# Patient Record
Sex: MALE | Race: WHITE | NOT HISPANIC OR LATINO | Employment: OTHER | URBAN - METROPOLITAN AREA
[De-identification: names, ages, dates, MRNs, and addresses within clinical notes are randomized per-mention and may not be internally consistent; named-entity substitution may affect disease eponyms.]

---

## 2017-06-12 ENCOUNTER — OFFICE VISIT (OUTPATIENT)
Dept: CARDIOLOGY | Facility: CLINIC | Age: 67
End: 2017-06-12

## 2017-06-12 VITALS
HEIGHT: 70 IN | DIASTOLIC BLOOD PRESSURE: 82 MMHG | BODY MASS INDEX: 27.92 KG/M2 | SYSTOLIC BLOOD PRESSURE: 124 MMHG | HEART RATE: 67 BPM | WEIGHT: 195 LBS

## 2017-06-12 DIAGNOSIS — Z98.890 HISTORY OF MITRAL VALVE REPAIR: ICD-10-CM

## 2017-06-12 DIAGNOSIS — I10 ESSENTIAL HYPERTENSION: ICD-10-CM

## 2017-06-12 DIAGNOSIS — I34.0 NON-RHEUMATIC MITRAL REGURGITATION: Primary | ICD-10-CM

## 2017-06-12 PROCEDURE — 99213 OFFICE O/P EST LOW 20 MIN: CPT | Performed by: INTERNAL MEDICINE

## 2017-06-12 PROCEDURE — 93000 ELECTROCARDIOGRAM COMPLETE: CPT | Performed by: INTERNAL MEDICINE

## 2017-06-12 RX ORDER — LEVOTHYROXINE SODIUM 0.15 MG/1
TABLET ORAL DAILY
COMMUNITY
Start: 2017-05-20 | End: 2019-08-12 | Stop reason: ALTCHOICE

## 2017-06-12 NOTE — PROGRESS NOTES
Date of Office Visit: 2017  Encounter Provider: Alek Pastrana MD  Place of Service: [facility] CARDIOLOGY  Patient Name: Uche Ramos  :1950    Chief Complaint   Patient presents with   • Cardiac Valve Problem     Mitral Valve     :     HPI: Uche Ramos is a 67 y.o. male who presents today to follow-up. He has a history of mitral regurgitation/prolapse and underwent mitral valve repair in 2014. He had a very brief episode of paroxysmal atrial fibrillation postoperatively. 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 He has completely recovered and feels very well. He denies any palpitations, lightheadedness, edema, dyspnea, or chest pain. His coronaries were normal prior to surgery.       Past Medical History:   Diagnosis Date   • Anemia    • Basal cell carcinoma    • History of colonoscopy     DR. CEDENO   • Hypercholesterolemia    • Hypertension    • Hypothyroidism    • Lipoma    • Mitral valve insufficiency     3/2014: mod-severe, with moderate LAE and mild LVE, due to prolapse; s/p repair with #31 ATS simulus band and 4-0 Goretex neochordae to P2.  Echo 2014 completely normal (with evidence of prior repair)   • Parapneumonic effusion     pneumonia and post-pericardiotomy syndrome, 2014, requiring VATS/decortication   • Postoperative atrial fibrillation     after open heart surgery, no recurrence   • Recurrent left pleural effusion        Past Surgical History:   Procedure Laterality Date   • BRONCHOSCOPY     • COLONOSCOPY     • KNEE SURGERY     • MAZE PROCEDURE      Pulmonary venous ablation with cryoablation 14, Dr Sales   • MITRAL VALVE REPAIR/REPLACEMENT      Mitral Valve Repair 14, Dr Ram   • THORACOSCOPY W/ TALC PLEURODESIS         Social History     Social History   • Marital status:      Spouse name:  "N/A   • Number of children: N/A   • Years of education: N/A     Occupational History   • Not on file.     Social History Main Topics   • Smoking status: Former Smoker     Quit date: 1/1/1981   • Smokeless tobacco: Never Used   • Alcohol use Yes      Comment: 8/week/ social use   • Drug use: No   • Sexual activity: Defer     Other Topics Concern   • Not on file     Social History Narrative       Family History   Problem Relation Age of Onset   • Hypertension Mother    • Diabetes Mother    • Stroke Mother    • Hypertension Father    • Other Father      cardiac disorder  some type of heart disease in his 80's (? if valvular, conduction, CAD, or CHF)   • Stroke Other        Review of Systems   Musculoskeletal: Positive for joint pain.   All other systems reviewed and are negative.      Allergies   Allergen Reactions   • Codeine    • Other      ANESTHESIA IV SET MISC         Current Outpatient Prescriptions:   •  aspirin 81 MG EC tablet, Take  by mouth every other day., Disp: , Rfl:   •  atorvastatin (LIPITOR) 40 MG tablet, TAKE ONE TABLET BY MOUTH ONCE DAILY, Disp: 90 tablet, Rfl: 1  •  desonide (DESOWEN) 0.05 % lotion, Apply topically., Disp: , Rfl:   •  levothyroxine (SYNTHROID, LEVOTHROID) 100 MCG tablet, Daily., Disp: , Rfl:   •  lisinopril (PRINIVIL,ZESTRIL) 20 MG tablet, TAKE ONE TABLET BY MOUTH ONCE DAILY, Disp: 90 tablet, Rfl: 1  •  temazepam (RESTORIL) 7.5 MG capsule, Take 1 capsule by mouth at night as needed for sleep., Disp: 30 capsule, Rfl: 0     Objective:     Vitals:    06/12/17 1312   BP: 124/82   Pulse: 67   Weight: 195 lb (88.5 kg)   Height: 70\" (177.8 cm)     Body mass index is 27.98 kg/(m^2).    Physical Exam   Constitutional: He is oriented to person, place, and time. He appears well-developed and well-nourished.   HENT:   Head: Normocephalic.   Nose: Nose normal.   Mouth/Throat: Oropharynx is clear and moist.   Eyes: Conjunctivae and EOM are normal. Pupils are equal, round, and reactive to light. "   Neck: Normal range of motion. No JVD present.   Cardiovascular: Normal rate, regular rhythm, normal heart sounds and intact distal pulses.    No murmur heard.  Pulmonary/Chest: Effort normal and breath sounds normal.   Abdominal: Soft. He exhibits no mass. There is no tenderness.   Musculoskeletal: Normal range of motion. He exhibits no edema.   Lymphadenopathy:     He has no cervical adenopathy.   Neurological: He is alert and oriented to person, place, and time. No cranial nerve deficit.   Skin: Skin is warm and dry. No rash noted.   Psychiatric: He has a normal mood and affect. His behavior is normal. Judgment and thought content normal.   Vitals reviewed.        ECG 12 Lead  Date/Time: 6/12/2017 1:34 PM  Performed by: ALEK PASTRANA  Authorized by: ALEK PASTRANA   Comparison: compared with previous ECG   Similar to previous ECG  Rhythm: sinus rhythm  Conduction: conduction normal  ST Segments: ST segments normal  T Waves: T waves normal  QRS axis: normal  Other: no other findings  Clinical impression: normal ECG              Assessment:       Diagnosis Plan   1. Non-rheumatic mitral regurgitation  Adult Transthoracic Echo Complete   2. History of mitral valve repair  Adult Transthoracic Echo Complete   3. Essential hypertension            Plan:       1/2.  He has done very well.  His last echo was in August 2014 so it's time for a surveillance study.      3.  His BP is well controlled.      Sincerely,       Alek Pastrana MD

## 2017-06-19 ENCOUNTER — HOSPITAL ENCOUNTER (OUTPATIENT)
Dept: CARDIOLOGY | Facility: HOSPITAL | Age: 67
Discharge: HOME | End: 2017-06-19
Attending: INTERNAL MEDICINE | Admitting: INTERNAL MEDICINE

## 2017-06-19 VITALS
BODY MASS INDEX: 27.92 KG/M2 | SYSTOLIC BLOOD PRESSURE: 120 MMHG | HEIGHT: 70 IN | HEART RATE: 69 BPM | DIASTOLIC BLOOD PRESSURE: 70 MMHG | WEIGHT: 195 LBS

## 2017-06-19 DIAGNOSIS — I34.0 NON-RHEUMATIC MITRAL REGURGITATION: ICD-10-CM

## 2017-06-19 DIAGNOSIS — Z98.890 HISTORY OF MITRAL VALVE REPAIR: ICD-10-CM

## 2017-06-19 LAB
ASCENDING AORTA: 3.7 CM
BH CV ECHO MEAS - ACS: 2 CM
BH CV ECHO MEAS - AO MAX PG (FULL): 3.3 MMHG
BH CV ECHO MEAS - AO MAX PG: 6.8 MMHG
BH CV ECHO MEAS - AO MEAN PG (FULL): 1.9 MMHG
BH CV ECHO MEAS - AO MEAN PG: 3.6 MMHG
BH CV ECHO MEAS - AO ROOT AREA (BSA CORRECTED): 1.9
BH CV ECHO MEAS - AO ROOT AREA: 11.5 CM^2
BH CV ECHO MEAS - AO ROOT DIAM: 3.8 CM
BH CV ECHO MEAS - AO V2 MAX: 130.3 CM/SEC
BH CV ECHO MEAS - AO V2 MEAN: 88.6 CM/SEC
BH CV ECHO MEAS - AO V2 VTI: 25.9 CM
BH CV ECHO MEAS - AVA(I,A): 2.2 CM^2
BH CV ECHO MEAS - AVA(I,D): 2.2 CM^2
BH CV ECHO MEAS - AVA(V,A): 2.3 CM^2
BH CV ECHO MEAS - AVA(V,D): 2.3 CM^2
BH CV ECHO MEAS - BSA(HAYCOCK): 2.1 M^2
BH CV ECHO MEAS - BSA: 2.1 M^2
BH CV ECHO MEAS - BZI_BMI: 28 KILOGRAMS/M^2
BH CV ECHO MEAS - BZI_METRIC_HEIGHT: 177.8 CM
BH CV ECHO MEAS - BZI_METRIC_WEIGHT: 88.5 KG
BH CV ECHO MEAS - CONTRAST EF (2CH): 63.5 ML/M^2
BH CV ECHO MEAS - CONTRAST EF 4CH: 64.9 ML/M^2
BH CV ECHO MEAS - EDV(MOD-SP2): 115 ML
BH CV ECHO MEAS - EDV(MOD-SP4): 111 ML
BH CV ECHO MEAS - EDV(TEICH): 137.7 ML
BH CV ECHO MEAS - EF(CUBED): 62.1 %
BH CV ECHO MEAS - EF(MOD-SP2): 63.5 %
BH CV ECHO MEAS - EF(MOD-SP4): 64.9 %
BH CV ECHO MEAS - EF(TEICH): 53.2 %
BH CV ECHO MEAS - ESV(MOD-SP2): 42 ML
BH CV ECHO MEAS - ESV(MOD-SP4): 39 ML
BH CV ECHO MEAS - ESV(TEICH): 64.5 ML
BH CV ECHO MEAS - FS: 27.6 %
BH CV ECHO MEAS - IVS/LVPW: 1
BH CV ECHO MEAS - IVSD: 1.2 CM
BH CV ECHO MEAS - LAT PEAK E' VEL: 20 CM/SEC
BH CV ECHO MEAS - LV DIASTOLIC VOL/BSA (35-75): 53.8 ML/M^2
BH CV ECHO MEAS - LV MASS(C)D: 258.2 GRAMS
BH CV ECHO MEAS - LV MASS(C)DI: 125 GRAMS/M^2
BH CV ECHO MEAS - LV MAX PG: 3.5 MMHG
BH CV ECHO MEAS - LV MEAN PG: 1.7 MMHG
BH CV ECHO MEAS - LV SYSTOLIC VOL/BSA (12-30): 18.9 ML/M^2
BH CV ECHO MEAS - LV V1 MAX: 93.6 CM/SEC
BH CV ECHO MEAS - LV V1 MEAN: 60.7 CM/SEC
BH CV ECHO MEAS - LV V1 VTI: 17.5 CM
BH CV ECHO MEAS - LVIDD: 5.3 CM
BH CV ECHO MEAS - LVIDS: 3.9 CM
BH CV ECHO MEAS - LVLD AP2: 7.6 CM
BH CV ECHO MEAS - LVLD AP4: 7.7 CM
BH CV ECHO MEAS - LVLS AP2: 6.2 CM
BH CV ECHO MEAS - LVLS AP4: 6.6 CM
BH CV ECHO MEAS - LVOT AREA (M): 3.1 CM^2
BH CV ECHO MEAS - LVOT AREA: 3.2 CM^2
BH CV ECHO MEAS - LVOT DIAM: 2 CM
BH CV ECHO MEAS - LVPWD: 1.2 CM
BH CV ECHO MEAS - MED PEAK E' VEL: 21 CM/SEC
BH CV ECHO MEAS - MR MAX PG: 84.3 MMHG
BH CV ECHO MEAS - MR MAX VEL: 459 CM/SEC
BH CV ECHO MEAS - MV A DUR: 0.09 SEC
BH CV ECHO MEAS - MV A MAX VEL: 86.9 CM/SEC
BH CV ECHO MEAS - MV DEC SLOPE: 256.4 CM/SEC^2
BH CV ECHO MEAS - MV DEC TIME: 0.26 SEC
BH CV ECHO MEAS - MV E MAX VEL: 131.6 CM/SEC
BH CV ECHO MEAS - MV E/A: 1.5
BH CV ECHO MEAS - MV MAX PG: 5.8 MMHG
BH CV ECHO MEAS - MV MEAN PG: 2.1 MMHG
BH CV ECHO MEAS - MV P1/2T MAX VEL: 116.3 CM/SEC
BH CV ECHO MEAS - MV P1/2T: 132.9 MSEC
BH CV ECHO MEAS - MV V2 MAX: 120.2 CM/SEC
BH CV ECHO MEAS - MV V2 MEAN: 66.9 CM/SEC
BH CV ECHO MEAS - MV V2 VTI: 43.4 CM
BH CV ECHO MEAS - MVA P1/2T LCG: 1.9 CM^2
BH CV ECHO MEAS - MVA(P1/2T): 1.7 CM^2
BH CV ECHO MEAS - MVA(VTI): 1.3 CM^2
BH CV ECHO MEAS - PA ACC TIME: 0.09 SEC
BH CV ECHO MEAS - PA MAX PG (FULL): 1 MMHG
BH CV ECHO MEAS - PA MAX PG: 2.1 MMHG
BH CV ECHO MEAS - PA PR(ACCEL): 39.4 MMHG
BH CV ECHO MEAS - PA V2 MAX: 73.3 CM/SEC
BH CV ECHO MEAS - PULM A REVS DUR: 0.07 SEC
BH CV ECHO MEAS - PULM A REVS VEL: 20 CM/SEC
BH CV ECHO MEAS - PULM DIAS VEL: 40.9 CM/SEC
BH CV ECHO MEAS - PULM S/D: 0.78
BH CV ECHO MEAS - PULM SYS VEL: 31.9 CM/SEC
BH CV ECHO MEAS - PVA(V,A): 3.3 CM^2
BH CV ECHO MEAS - PVA(V,D): 3.3 CM^2
BH CV ECHO MEAS - QP/QS: 0.95
BH CV ECHO MEAS - RAP SYSTOLE: 3 MMHG
BH CV ECHO MEAS - RV MAX PG: 1.1 MMHG
BH CV ECHO MEAS - RV MEAN PG: 0.65 MMHG
BH CV ECHO MEAS - RV V1 MAX: 52.9 CM/SEC
BH CV ECHO MEAS - RV V1 MEAN: 38.8 CM/SEC
BH CV ECHO MEAS - RV V1 VTI: 11.7 CM
BH CV ECHO MEAS - RVOT AREA: 4.6 CM^2
BH CV ECHO MEAS - RVOT DIAM: 2.4 CM
BH CV ECHO MEAS - RVSP: 23.9 MMHG
BH CV ECHO MEAS - SI(AO): 144.5 ML/M^2
BH CV ECHO MEAS - SI(CUBED): 45.8 ML/M^2
BH CV ECHO MEAS - SI(LVOT): 27.5 ML/M^2
BH CV ECHO MEAS - SI(MOD-SP2): 35.4 ML/M^2
BH CV ECHO MEAS - SI(MOD-SP4): 34.9 ML/M^2
BH CV ECHO MEAS - SI(TEICH): 35.4 ML/M^2
BH CV ECHO MEAS - SUP REN AO DIAM: 1.8 CM
BH CV ECHO MEAS - SV(AO): 298.3 ML
BH CV ECHO MEAS - SV(CUBED): 94.5 ML
BH CV ECHO MEAS - SV(LVOT): 56.8 ML
BH CV ECHO MEAS - SV(MOD-SP2): 73 ML
BH CV ECHO MEAS - SV(MOD-SP4): 72 ML
BH CV ECHO MEAS - SV(RVOT): 53.9 ML
BH CV ECHO MEAS - SV(TEICH): 73.2 ML
BH CV ECHO MEAS - TAPSE (>1.6): 1.7 CM2
BH CV ECHO MEAS - TR MAX VEL: 228.8 CM/SEC
BH CV XLRA - RV BASE: 2.4 CM
BH CV XLRA - TDI S': 11 CM/SEC
E/E' RATIO: 20.5
LEFT ATRIUM VOLUME INDEX: 32 ML/M2
SINUS: 3.3 CM
STJ: 3 CM

## 2017-06-19 PROCEDURE — 93306 TTE W/DOPPLER COMPLETE: CPT | Performed by: INTERNAL MEDICINE

## 2017-06-19 PROCEDURE — 93306 TTE W/DOPPLER COMPLETE: CPT

## 2018-08-07 NOTE — PROGRESS NOTES
Date of Office Visit: 2018  Encounter Provider: Alek Pastrana MD  Place of Service: [facility] CARDIOLOGY  Patient Name: Uche Ramos  :1950    Chief Complaint   Patient presents with   • Non-rheumatic mitral regurgitation     Yearly    • Mitral valve insufficiency   :     HPI: Uche Ramos is a 68 y.o. male who presents today to follow-up. He has a history of mitral regurgitation/prolapse and underwent mitral valve repair in 2014. He had a very brief episode of paroxysmal atrial fibrillation postoperatively. 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 He has completely recovered and feels very well. He denies any palpitations, lightheadedness, edema, dyspnea, or chest pain. His coronaries were normal prior to surgery.     A surveillance echo in  showed mild-moderate MR.    Past Medical History:   Diagnosis Date   • Anemia    • Basal cell carcinoma    • History of colonoscopy     DR. CEDENO   • Hypercholesterolemia    • Hypertension    • Hypothyroidism    • Lipoma    • Mitral valve insufficiency     3/2014: mod-severe, with moderate LAE and mild LVE, due to prolapse; s/p repair with #31 ATS simulus band and 4-0 Goretex neochordae to P2.  Echo 2014 completely normal (with evidence of prior repair)   • Parapneumonic effusion     pneumonia and post-pericardiotomy syndrome, 2014, requiring VATS/decortication   • Postoperative atrial fibrillation (CMS/HCC)     after open heart surgery, no recurrence   • Recurrent left pleural effusion        Past Surgical History:   Procedure Laterality Date   • BRONCHOSCOPY     • COLONOSCOPY     • KNEE SURGERY     • MAZE PROCEDURE      Pulmonary venous ablation with cryoablation 14, Dr Sales   • MITRAL VALVE REPAIR/REPLACEMENT      Mitral Valve Repair 14, Dr Ram   • THORACOSCOPY W/  "TALC PLEURODESIS         Social History     Social History   • Marital status:      Spouse name: N/A   • Number of children: N/A   • Years of education: N/A     Occupational History   • Not on file.     Social History Main Topics   • Smoking status: Former Smoker     Quit date: 1/1/1981   • Smokeless tobacco: Never Used      Comment: Caffeine use   • Alcohol use Yes      Comment: 8/week/ social use   • Drug use: No   • Sexual activity: Defer     Other Topics Concern   • Not on file     Social History Narrative   • No narrative on file       Family History   Problem Relation Age of Onset   • Hypertension Mother    • Diabetes Mother    • Stroke Mother    • Hypertension Father    • Other Father         cardiac disorder  some type of heart disease in his 80's (? if valvular, conduction, CAD, or CHF)   • Stroke Other        Review of Systems   Musculoskeletal: Positive for joint pain.   All other systems reviewed and are negative.      Allergies   Allergen Reactions   • Codeine    • Other      ANESTHESIA IV SET MISC         Current Outpatient Prescriptions:   •  aspirin 81 MG EC tablet, Take  by mouth every other day., Disp: , Rfl:   •  atorvastatin (LIPITOR) 40 MG tablet, TAKE ONE TABLET BY MOUTH ONCE DAILY, Disp: 90 tablet, Rfl: 1  •  levothyroxine (SYNTHROID, LEVOTHROID) 150 MCG tablet, Daily., Disp: , Rfl:   •  lisinopril (PRINIVIL,ZESTRIL) 20 MG tablet, TAKE ONE TABLET BY MOUTH ONCE DAILY, Disp: 90 tablet, Rfl: 1  •  temazepam (RESTORIL) 7.5 MG capsule, Take 1 capsule by mouth at night as needed for sleep., Disp: 30 capsule, Rfl: 0  •  desonide (DESOWEN) 0.05 % lotion, Apply topically., Disp: , Rfl:      Objective:     Vitals:    08/09/18 1150   BP: 124/60   BP Location: Left arm   Pulse: 73   Weight: 83.5 kg (184 lb)   Height: 177.8 cm (70\")     Body mass index is 26.4 kg/m².    Physical Exam   Constitutional: He is oriented to person, place, and time. He appears well-developed and well-nourished.   HENT: "   Head: Normocephalic.   Nose: Nose normal.   Mouth/Throat: Oropharynx is clear and moist.   Eyes: Pupils are equal, round, and reactive to light. Conjunctivae and EOM are normal.   Neck: Normal range of motion. No JVD present.   Cardiovascular: Normal rate, regular rhythm, normal heart sounds and intact distal pulses.    No murmur heard.  Pulmonary/Chest: Effort normal and breath sounds normal.   Abdominal: Soft. He exhibits no mass. There is no tenderness.   Musculoskeletal: Normal range of motion. He exhibits no edema.   Lymphadenopathy:     He has no cervical adenopathy.   Neurological: He is alert and oriented to person, place, and time. No cranial nerve deficit.   Skin: Skin is warm and dry. No rash noted.   Psychiatric: He has a normal mood and affect. His behavior is normal. Judgment and thought content normal.   Vitals reviewed.        ECG 12 Lead  Date/Time: 8/9/2018 11:57 AM  Performed by: ALEK PASTRANA  Authorized by: ALEK PASTRANA   Comparison: compared with previous ECG   Similar to previous ECG  Rhythm: sinus rhythm  Conduction: conduction normal  ST Segments: ST segments normal  T Waves: T waves normal  QRS axis: normal  Other findings: PRWP  Clinical impression: non-specific ECG              Assessment:       Diagnosis Plan   1. Non-rheumatic mitral regurgitation     2. History of mitral valve repair     3. Essential hypertension            Plan:       1/2.  He has done very well.  His last echo was in 2017; this will be repeated in 2022.\    3.  His BP is well controlled.      Sincerely,       Alek Pastrana MD

## 2018-08-09 ENCOUNTER — OFFICE VISIT (OUTPATIENT)
Dept: CARDIOLOGY | Facility: CLINIC | Age: 68
End: 2018-08-09

## 2018-08-09 VITALS
BODY MASS INDEX: 26.34 KG/M2 | HEIGHT: 70 IN | SYSTOLIC BLOOD PRESSURE: 124 MMHG | DIASTOLIC BLOOD PRESSURE: 60 MMHG | HEART RATE: 73 BPM | WEIGHT: 184 LBS

## 2018-08-09 DIAGNOSIS — I10 ESSENTIAL HYPERTENSION: ICD-10-CM

## 2018-08-09 DIAGNOSIS — I34.0 NON-RHEUMATIC MITRAL REGURGITATION: Primary | ICD-10-CM

## 2018-08-09 DIAGNOSIS — Z98.890 HISTORY OF MITRAL VALVE REPAIR: ICD-10-CM

## 2018-08-09 PROCEDURE — 93000 ELECTROCARDIOGRAM COMPLETE: CPT | Performed by: INTERNAL MEDICINE

## 2018-08-09 PROCEDURE — 99213 OFFICE O/P EST LOW 20 MIN: CPT | Performed by: INTERNAL MEDICINE

## 2019-08-12 ENCOUNTER — OFFICE VISIT (OUTPATIENT)
Dept: CARDIOLOGY | Facility: CLINIC | Age: 69
End: 2019-08-12

## 2019-08-12 VITALS
HEART RATE: 77 BPM | DIASTOLIC BLOOD PRESSURE: 74 MMHG | SYSTOLIC BLOOD PRESSURE: 118 MMHG | BODY MASS INDEX: 26.17 KG/M2 | HEIGHT: 70 IN | WEIGHT: 182.8 LBS

## 2019-08-12 DIAGNOSIS — Z98.890 HISTORY OF MITRAL VALVE REPAIR: Primary | ICD-10-CM

## 2019-08-12 DIAGNOSIS — I10 ESSENTIAL HYPERTENSION: ICD-10-CM

## 2019-08-12 PROCEDURE — 93000 ELECTROCARDIOGRAM COMPLETE: CPT | Performed by: INTERNAL MEDICINE

## 2019-08-12 PROCEDURE — 99213 OFFICE O/P EST LOW 20 MIN: CPT | Performed by: INTERNAL MEDICINE

## 2019-08-12 RX ORDER — ZOLPIDEM TARTRATE 5 MG/1
TABLET ORAL AS NEEDED
COMMUNITY
Start: 2019-06-11 | End: 2021-10-28 | Stop reason: ALTCHOICE

## 2019-08-12 RX ORDER — LEVOTHYROXINE SODIUM 0.1 MG/1
TABLET ORAL DAILY
COMMUNITY
Start: 2019-08-09

## 2019-08-12 NOTE — PROGRESS NOTES
Date of Office Visit: 2018  Encounter Provider: Alek Pastrana MD  Place of Service: [facility] CARDIOLOGY  Patient Name: Uche Ramos  :1950    Chief Complaint   Patient presents with   • Cardiac Valve Problem   :     HPI: Uche Ramos is a 69 y.o. male who presents today to follow-up. He has a history of mitral regurgitation/prolapse and underwent mitral valve repair in 2014. He had a very brief episode of paroxysmal atrial fibrillation postoperatively. 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 He has completely recovered and feels very well. He denies any palpitations, lightheadedness, edema, dyspnea, or chest pain. His coronaries were normal prior to surgery.     A surveillance echo in  showed mild-moderate MR.    Past Medical History:   Diagnosis Date   • Anemia    • Basal cell carcinoma    • History of colonoscopy     DR. CEDENO   • Hypercholesterolemia    • Hypertension    • Hypothyroidism    • Lipoma    • Mitral valve insufficiency     3/2014: mod-severe, with moderate LAE and mild LVE, due to prolapse; s/p repair with #31 ATS simulus band and 4-0 Goretex neochordae to P2.  Echo 2014 completely normal (with evidence of prior repair)   • Parapneumonic effusion     pneumonia and post-pericardiotomy syndrome, 2014, requiring VATS/decortication   • Postoperative atrial fibrillation (CMS/HCC)     after open heart surgery, no recurrence   • Recurrent left pleural effusion        Past Surgical History:   Procedure Laterality Date   • BRONCHOSCOPY     • COLONOSCOPY     • KNEE SURGERY     • MAZE PROCEDURE      Pulmonary venous ablation with cryoablation 14, Dr Sales   • MITRAL VALVE REPAIR/REPLACEMENT      Mitral Valve Repair 14, Dr Ram   • THORACOSCOPY W/ TALC PLEURODESIS         Social History     Socioeconomic  "History   • Marital status:      Spouse name: Not on file   • Number of children: Not on file   • Years of education: Not on file   • Highest education level: Not on file   Occupational History   • Occupation: RETIRED    Tobacco Use   • Smoking status: Former Smoker     Last attempt to quit: 1981     Years since quittin.6   • Smokeless tobacco: Never Used   • Tobacco comment: Caffeine use: 1 CUP DAILY.   Substance and Sexual Activity   • Alcohol use: Yes     Alcohol/week: 1.2 oz     Types: 2 Cans of beer per week     Frequency: 2-3 times a week     Comment: 8/week/ social use   • Drug use: No   • Sexual activity: Defer       Family History   Problem Relation Age of Onset   • Hypertension Mother    • Diabetes Mother    • Stroke Mother    • Hypertension Father    • Other Father         cardiac disorder  some type of heart disease in his 80's (? if valvular, conduction, CAD, or CHF)   • Stroke Other        Review of Systems   All other systems reviewed and are negative.      Allergies   Allergen Reactions   • Codeine    • Other      ANESTHESIA IV SET MISC         Current Outpatient Medications:   •  aspirin 81 MG EC tablet, Take  by mouth every other day., Disp: , Rfl:   •  atorvastatin (LIPITOR) 40 MG tablet, TAKE ONE TABLET BY MOUTH ONCE DAILY, Disp: 90 tablet, Rfl: 1  •  desonide (DESOWEN) 0.05 % lotion, Apply topically., Disp: , Rfl:   •  levothyroxine (SYNTHROID, LEVOTHROID) 100 MCG tablet, Daily., Disp: , Rfl:   •  lisinopril (PRINIVIL,ZESTRIL) 20 MG tablet, TAKE ONE TABLET BY MOUTH ONCE DAILY, Disp: 90 tablet, Rfl: 1  •  zolpidem (AMBIEN) 5 MG tablet, As Needed., Disp: , Rfl:      Objective:     Vitals:    19 1147   BP: 118/74   BP Location: Left arm   Pulse: 77   Weight: 82.9 kg (182 lb 12.8 oz)   Height: 177.8 cm (70\")     Body mass index is 26.23 kg/m².    Physical Exam   Constitutional: He is oriented to person, place, and time. He appears well-developed and well-nourished.   HENT: "   Head: Normocephalic.   Nose: Nose normal.   Mouth/Throat: Oropharynx is clear and moist.   Eyes: Conjunctivae and EOM are normal. Pupils are equal, round, and reactive to light.   Neck: Normal range of motion. No JVD present.   Cardiovascular: Normal rate, regular rhythm, normal heart sounds and intact distal pulses.   No murmur heard.  Pulmonary/Chest: Effort normal and breath sounds normal.   Abdominal: Soft. There is no tenderness.   Musculoskeletal: Normal range of motion. He exhibits no edema.   Lymphadenopathy:     He has no cervical adenopathy.   Neurological: He is alert and oriented to person, place, and time. No cranial nerve deficit.   Skin: Skin is warm and dry. No rash noted.   Psychiatric: He has a normal mood and affect. His behavior is normal. Judgment and thought content normal.   Vitals reviewed.        ECG 12 Lead  Date/Time: 8/12/2019 12:05 PM  Performed by: Alek Pastrana MD  Authorized by: Alek Pastrana MD   Rhythm comments: Ectopic atrial rhythm  Conduction comments: Short MD interval  ST Segments: ST segments normal  T Waves: T waves normal  Other: no other findings    Clinical impression: non-specific ECG              Assessment:       Diagnosis Plan   1. History of mitral valve repair     2. Essential hypertension            Plan:       1.  He has done very well.  His last echo was in 2017; this will be repeated in 2022.    2.  His BP is well controlled.      Sincerely,       Alek Pastrana MD

## 2020-02-11 ENCOUNTER — TELEPHONE (OUTPATIENT)
Dept: CARDIOLOGY | Facility: CLINIC | Age: 70
End: 2020-02-11

## 2020-02-11 NOTE — TELEPHONE ENCOUNTER
Received a fax regarding pt's upcoming left knee surgery with epidural at [facility].      I faxed form back to the provided number.

## 2020-08-07 DIAGNOSIS — Z98.890 HISTORY OF MITRAL VALVE REPAIR: Primary | ICD-10-CM

## 2020-09-14 NOTE — PROGRESS NOTES
RM:________     PCP: Joo Barker MD    : 1950  AGE: 70 y.o.  EST PATIENT   REASON FOR VISIT/  CC:        WT: ____________ BP: __________L __________R HR______    CHEST PAIN: _____________    SOA: _____________PALPS: _______________     LIGHTHEADED: ___________FATIGUE: ________________ EDEMA __________    ALLERGIES:Codeine and Other SMOKING HISTORY:  Social History     Tobacco Use   • Smoking status: Former Smoker     Quit date: 1981     Years since quittin.7   • Smokeless tobacco: Never Used   • Tobacco comment: Caffeine use: 1 CUP DAILY.   Substance Use Topics   • Alcohol use: Yes     Alcohol/week: 2.0 standard drinks     Types: 2 Cans of beer per week     Frequency: 2-3 times a week     Comment: 8/week/ social use   • Drug use: No     CAFFEINE USE_________________  ALCOHOL ______________________    Below is the patient's most recent value for Albumin, ALT, AST, BUN, Calcium, Chloride, Cholesterol, CO2, Creatinine, GFR, Glucose, HDL, Hematocrit, Hemoglobin, Hemoglobin A1C, LDL, Magnesium, Phosphorus, Platelets, Potassium, PSA, Sodium, Triglycerides, TSH and WBC.   Lab Results   Component Value Date    ALBUMIN 3.8 2019    ALT 24 2019    AST 26 2019    BUN 19 2019    CALCIUM 9.2 2019     2019    CO2 23 2019    CREATININE 0.9 2019    GLU 77 2019    HDL 42 2019    HCT 43.9 2019    HGB 13.8 2019    HGBA1C 5.1 2018    LDL 70 2019    MG 2.4 2015     2019    K 4.7 2019    PSA 0.50 2018     2019    TRIG 119 2019    TSH 0.054 (L) 2019    WBC 6.67 2019          NEW DIAGNOSIS/ SURGERY/ HOSP OR ED VISITS: ______________________    __________________________________________________________________      RECENT LABS OR DIAGNOSTIC TESTING:  _____________________________    __________________________________________________________________      ASSESSMENT/  PLAN: _______________________________________________    __________________________________________________________________

## 2020-09-17 ENCOUNTER — OFFICE VISIT (OUTPATIENT)
Dept: CARDIOLOGY | Facility: CLINIC | Age: 70
End: 2020-09-17

## 2020-09-17 ENCOUNTER — HOSPITAL ENCOUNTER (OUTPATIENT)
Dept: CARDIOLOGY | Facility: HOSPITAL | Age: 70
Discharge: HOME | End: 2020-09-17
Admitting: INTERNAL MEDICINE

## 2020-09-17 VITALS — HEIGHT: 70 IN | HEART RATE: 68 BPM | BODY MASS INDEX: 26.05 KG/M2 | WEIGHT: 182 LBS

## 2020-09-17 VITALS
BODY MASS INDEX: 28.26 KG/M2 | HEART RATE: 60 BPM | DIASTOLIC BLOOD PRESSURE: 84 MMHG | SYSTOLIC BLOOD PRESSURE: 120 MMHG | HEIGHT: 70 IN | WEIGHT: 197.4 LBS

## 2020-09-17 DIAGNOSIS — I10 ESSENTIAL HYPERTENSION: ICD-10-CM

## 2020-09-17 DIAGNOSIS — Z98.890 HISTORY OF MITRAL VALVE REPAIR: ICD-10-CM

## 2020-09-17 DIAGNOSIS — Z98.890 HISTORY OF MITRAL VALVE REPAIR: Primary | ICD-10-CM

## 2020-09-17 LAB
AORTIC ARCH: 2.4 CM
ASCENDING AORTA: 3.7 CM
BH CV ECHO MEAS - ACS: 2.2 CM
BH CV ECHO MEAS - AO MAX PG (FULL): 1.6 MMHG
BH CV ECHO MEAS - AO MAX PG: 5.6 MMHG
BH CV ECHO MEAS - AO MEAN PG (FULL): 0.68 MMHG
BH CV ECHO MEAS - AO MEAN PG: 2.3 MMHG
BH CV ECHO MEAS - AO V2 MAX: 118.3 CM/SEC
BH CV ECHO MEAS - AO V2 MEAN: 68.7 CM/SEC
BH CV ECHO MEAS - AO V2 VTI: 24.2 CM
BH CV ECHO MEAS - ASC AORTA: 3.7 CM
BH CV ECHO MEAS - AVA(I,A): 3.4 CM^2
BH CV ECHO MEAS - AVA(I,D): 3.4 CM^2
BH CV ECHO MEAS - AVA(V,A): 3.3 CM^2
BH CV ECHO MEAS - AVA(V,D): 3.3 CM^2
BH CV ECHO MEAS - BSA(HAYCOCK): 2 M^2
BH CV ECHO MEAS - BSA: 2 M^2
BH CV ECHO MEAS - BZI_BMI: 26.1 KILOGRAMS/M^2
BH CV ECHO MEAS - BZI_METRIC_HEIGHT: 177.8 CM
BH CV ECHO MEAS - BZI_METRIC_WEIGHT: 82.6 KG
BH CV ECHO MEAS - EDV(MOD-SP2): 80 ML
BH CV ECHO MEAS - EDV(MOD-SP4): 82 ML
BH CV ECHO MEAS - EDV(TEICH): 151.2 ML
BH CV ECHO MEAS - EF(CUBED): 62.4 %
BH CV ECHO MEAS - EF(MOD-BP): 64.3 %
BH CV ECHO MEAS - EF(MOD-SP2): 66.3 %
BH CV ECHO MEAS - EF(MOD-SP4): 63.4 %
BH CV ECHO MEAS - EF(TEICH): 53.4 %
BH CV ECHO MEAS - ESV(MOD-SP2): 27 ML
BH CV ECHO MEAS - ESV(MOD-SP4): 30 ML
BH CV ECHO MEAS - ESV(TEICH): 70.5 ML
BH CV ECHO MEAS - FS: 27.8 %
BH CV ECHO MEAS - IVS/LVPW: 0.95
BH CV ECHO MEAS - IVSD: 1.1 CM
BH CV ECHO MEAS - LAT PEAK E' VEL: 7.4 CM/SEC
BH CV ECHO MEAS - LV DIASTOLIC VOL/BSA (35-75): 40.9 ML/M^2
BH CV ECHO MEAS - LV MASS(C)D: 243.7 GRAMS
BH CV ECHO MEAS - LV MASS(C)DI: 121.5 GRAMS/M^2
BH CV ECHO MEAS - LV MAX PG: 4 MMHG
BH CV ECHO MEAS - LV MEAN PG: 1.6 MMHG
BH CV ECHO MEAS - LV SYSTOLIC VOL/BSA (12-30): 15 ML/M^2
BH CV ECHO MEAS - LV V1 MAX: 99.8 CM/SEC
BH CV ECHO MEAS - LV V1 MEAN: 55.2 CM/SEC
BH CV ECHO MEAS - LV V1 VTI: 20.8 CM
BH CV ECHO MEAS - LVIDD: 5.6 CM
BH CV ECHO MEAS - LVIDS: 4 CM
BH CV ECHO MEAS - LVLD AP2: 8.5 CM
BH CV ECHO MEAS - LVLD AP4: 8.2 CM
BH CV ECHO MEAS - LVLS AP2: 6.8 CM
BH CV ECHO MEAS - LVLS AP4: 7.3 CM
BH CV ECHO MEAS - LVOT AREA (M): 3.8 CM^2
BH CV ECHO MEAS - LVOT AREA: 3.9 CM^2
BH CV ECHO MEAS - LVOT DIAM: 2.2 CM
BH CV ECHO MEAS - LVPWD: 1.1 CM
BH CV ECHO MEAS - MED PEAK E' VEL: 6.9 CM/SEC
BH CV ECHO MEAS - MR MAX PG: 110.5 MMHG
BH CV ECHO MEAS - MR MAX VEL: 525.6 CM/SEC
BH CV ECHO MEAS - MV A DUR: 0.14 SEC
BH CV ECHO MEAS - MV A MAX VEL: 83.2 CM/SEC
BH CV ECHO MEAS - MV DEC SLOPE: 451.1 CM/SEC^2
BH CV ECHO MEAS - MV DEC TIME: 0.35 SEC
BH CV ECHO MEAS - MV E MAX VEL: 115 CM/SEC
BH CV ECHO MEAS - MV E/A: 1.4
BH CV ECHO MEAS - MV MAX PG: 8.5 MMHG
BH CV ECHO MEAS - MV MEAN PG: 2.9 MMHG
BH CV ECHO MEAS - MV P1/2T MAX VEL: 126.7 CM/SEC
BH CV ECHO MEAS - MV P1/2T: 82.3 MSEC
BH CV ECHO MEAS - MV V2 MAX: 146 CM/SEC
BH CV ECHO MEAS - MV V2 MEAN: 79.4 CM/SEC
BH CV ECHO MEAS - MV V2 VTI: 42.8 CM
BH CV ECHO MEAS - MVA P1/2T LCG: 1.7 CM^2
BH CV ECHO MEAS - MVA(P1/2T): 2.7 CM^2
BH CV ECHO MEAS - MVA(VTI): 1.9 CM^2
BH CV ECHO MEAS - PA MAX PG (FULL): 1.8 MMHG
BH CV ECHO MEAS - PA MAX PG: 2.9 MMHG
BH CV ECHO MEAS - PA V2 MAX: 84.5 CM/SEC
BH CV ECHO MEAS - PULM A REVS DUR: 0.12 SEC
BH CV ECHO MEAS - PULM A REVS VEL: 22.9 CM/SEC
BH CV ECHO MEAS - PULM DIAS VEL: 47.7 CM/SEC
BH CV ECHO MEAS - PULM S/D: 0.65
BH CV ECHO MEAS - PULM SYS VEL: 30.8 CM/SEC
BH CV ECHO MEAS - PVA(V,A): 2.3 CM^2
BH CV ECHO MEAS - PVA(V,D): 2.3 CM^2
BH CV ECHO MEAS - QP/QS: 0.57
BH CV ECHO MEAS - RV MAX PG: 1.1 MMHG
BH CV ECHO MEAS - RV MEAN PG: 0.51 MMHG
BH CV ECHO MEAS - RV V1 MAX: 52.3 CM/SEC
BH CV ECHO MEAS - RV V1 MEAN: 31.7 CM/SEC
BH CV ECHO MEAS - RV V1 VTI: 12.6 CM
BH CV ECHO MEAS - RVOT AREA: 3.7 CM^2
BH CV ECHO MEAS - RVOT DIAM: 2.2 CM
BH CV ECHO MEAS - SI(CUBED): 53.5 ML/M^2
BH CV ECHO MEAS - SI(LVOT): 40.6 ML/M^2
BH CV ECHO MEAS - SI(MOD-SP2): 26.4 ML/M^2
BH CV ECHO MEAS - SI(MOD-SP4): 25.9 ML/M^2
BH CV ECHO MEAS - SI(TEICH): 40.2 ML/M^2
BH CV ECHO MEAS - SV(CUBED): 107.4 ML
BH CV ECHO MEAS - SV(LVOT): 81.3 ML
BH CV ECHO MEAS - SV(MOD-SP2): 53 ML
BH CV ECHO MEAS - SV(MOD-SP4): 52 ML
BH CV ECHO MEAS - SV(RVOT): 46.7 ML
BH CV ECHO MEAS - SV(TEICH): 80.7 ML
BH CV ECHO MEAS - TAPSE (>1.6): 2 CM2
BH CV ECHO MEASUREMENTS AVERAGE E/E' RATIO: 16.08
BH CV XLRA - RV BASE: 3.5 CM
BH CV XLRA - RV LENGTH: 6.8 CM
BH CV XLRA - RV MID: 2.7 CM
BH CV XLRA - TDI S': 9.7 CM/SEC
LEFT ATRIUM VOLUME INDEX: 24 ML/M2
SINUS: 4 CM
STJ: 3.2 CM

## 2020-09-17 PROCEDURE — 93306 TTE W/DOPPLER COMPLETE: CPT

## 2020-09-17 PROCEDURE — 99213 OFFICE O/P EST LOW 20 MIN: CPT | Performed by: INTERNAL MEDICINE

## 2020-09-17 PROCEDURE — 93306 TTE W/DOPPLER COMPLETE: CPT | Performed by: INTERNAL MEDICINE

## 2020-09-17 PROCEDURE — 93000 ELECTROCARDIOGRAM COMPLETE: CPT | Performed by: INTERNAL MEDICINE

## 2020-09-17 NOTE — PROGRESS NOTES
Date of Office Visit: 2020  Encounter Provider: Alek Pastrana MD  Place of Service: [facility] CARDIOLOGY  Patient Name: Uche Ramos  :1950    Chief Complaint   Patient presents with   • Mitral Valve Prolapse   :     HPI: Uche Ramos is a 70 y.o. male who presents today to follow-up.     He has a history of mitral regurgitation/prolapse and underwent mitral valve repair in 2014. He had a very brief episode of paroxysmal atrial fibrillation postoperatively. 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 He has completely recovered and feels very well. He denies any palpitations, lightheadedness, edema, dyspnea, or chest pain. His coronaries were normal prior to surgery.     A surveillance echo in  showed mild MR and was otherwise normal.    Past Medical History:   Diagnosis Date   • Anemia    • Basal cell carcinoma    • History of colonoscopy     DR. CEDENO   • Hypercholesterolemia    • Hypertension    • Hypothyroidism    • Lipoma    • Mitral valve insufficiency     3/2014: mod-severe, with moderate LAE and mild LVE, due to prolapse; s/p repair with #31 ATS simulus band and 4-0 Goretex neochordae to P2.  Echo 2014 completely normal (with evidence of prior repair)   • Parapneumonic effusion     pneumonia and post-pericardiotomy syndrome, 2014, requiring VATS/decortication   • Postoperative atrial fibrillation (CMS/HCC)     after open heart surgery, no recurrence   • Recurrent left pleural effusion        Past Surgical History:   Procedure Laterality Date   • BRONCHOSCOPY     • COLONOSCOPY     • KNEE SURGERY     • MAZE PROCEDURE      Pulmonary venous ablation with cryoablation 14, Dr Sales   • MITRAL VALVE REPAIR/REPLACEMENT      Mitral Valve Repair 14, Dr Ram   • THORACOSCOPY W/ TALC PLEURODESIS         Social History  "    Socioeconomic History   • Marital status:      Spouse name: Not on file   • Number of children: Not on file   • Years of education: Not on file   • Highest education level: Not on file   Occupational History   • Occupation: RETIRED    Tobacco Use   • Smoking status: Former Smoker     Quit date: 1981     Years since quittin.7   • Smokeless tobacco: Never Used   • Tobacco comment: Caffeine use: 1 CUP DAILY.   Substance and Sexual Activity   • Alcohol use: Yes     Alcohol/week: 2.0 standard drinks     Types: 2 Cans of beer per week     Frequency: 2-3 times a week     Comment: 8/week/ social use   • Drug use: No   • Sexual activity: Defer       Family History   Problem Relation Age of Onset   • Hypertension Mother    • Diabetes Mother    • Stroke Mother    • Hypertension Father    • Other Father         cardiac disorder  some type of heart disease in his 80's (? if valvular, conduction, CAD, or CHF)   • Stroke Other        Review of Systems   Musculoskeletal: Positive for arthritis.   All other systems reviewed and are negative.      Allergies   Allergen Reactions   • Codeine    • Other      ANESTHESIA IV SET MISC         Current Outpatient Medications:   •  aspirin 81 MG EC tablet, Take  by mouth every other day., Disp: , Rfl:   •  atorvastatin (LIPITOR) 40 MG tablet, TAKE ONE TABLET BY MOUTH ONCE DAILY, Disp: 90 tablet, Rfl: 1  •  desonide (DESOWEN) 0.05 % lotion, Apply topically., Disp: , Rfl:   •  levothyroxine (SYNTHROID, LEVOTHROID) 100 MCG tablet, Daily., Disp: , Rfl:   •  lisinopril (PRINIVIL,ZESTRIL) 20 MG tablet, TAKE ONE TABLET BY MOUTH ONCE DAILY, Disp: 90 tablet, Rfl: 1  •  zolpidem (AMBIEN) 5 MG tablet, As Needed., Disp: , Rfl:      Objective:     Vitals:    20 0849   BP: 120/84   Pulse: 60   Weight: 89.5 kg (197 lb 6.4 oz)   Height: 177.8 cm (70\")     Body mass index is 28.32 kg/m².    Physical Exam   Constitutional: He is oriented to person, place, and time. He appears " well-developed and well-nourished.   HENT:   Head: Normocephalic.   Nose: Nose normal.   Eyes: Pupils are equal, round, and reactive to light. Conjunctivae and EOM are normal.   Neck: Normal range of motion. No JVD present.   Cardiovascular: Normal rate, regular rhythm, normal heart sounds and intact distal pulses.   No murmur heard.  Pulmonary/Chest: Effort normal and breath sounds normal.   Abdominal: Soft. There is no abdominal tenderness.   Musculoskeletal: Normal range of motion.         General: No edema.   Neurological: He is alert and oriented to person, place, and time. No cranial nerve deficit.   Skin: Skin is warm and dry. No rash noted.   Psychiatric: He has a normal mood and affect. His behavior is normal. Judgment and thought content normal.   Vitals reviewed.        ECG 12 Lead    Date/Time: 9/17/2020 9:06 AM  Performed by: Alek Pastrana MD  Authorized by: Alek Pastrana MD   Comparison: compared with previous ECG   Similar to previous ECG  Rhythm: sinus rhythm  Conduction: conduction normal  ST Segments: ST segments normal  T Waves: T waves normal  QRS axis: normal  Other: no other findings    Clinical impression: normal ECG            Assessment:       Diagnosis Plan   1. History of mitral valve repair     2. Essential hypertension       Plan:       1.  He has done very well.  He had a surveillance echo today that looked great.     2.  His BP is very well controlled.      Sincerely,       Alek Pastrana MD

## 2020-12-14 RX ORDER — AMOXICILLIN 500 MG/1
2000 CAPSULE ORAL SEE ADMIN INSTRUCTIONS
Qty: 12 CAPSULE | Refills: 0 | Status: SHIPPED | OUTPATIENT
Start: 2020-12-14 | End: 2021-10-28 | Stop reason: SDUPTHER

## 2020-12-14 NOTE — TELEPHONE ENCOUNTER
Please see pending rx for pre med prior to dental procedure.  Pt has hx of MVP and has a dental procedure that they will be rescheduling.

## 2021-03-19 ENCOUNTER — BULK ORDERING (OUTPATIENT)
Dept: CASE MANAGEMENT | Facility: OTHER | Age: 71
End: 2021-03-19

## 2021-03-19 DIAGNOSIS — Z23 IMMUNIZATION DUE: ICD-10-CM

## 2021-04-02 ENCOUNTER — TELEPHONE (OUTPATIENT)
Dept: CARDIOLOGY | Facility: CLINIC | Age: 71
End: 2021-04-02

## 2021-04-02 NOTE — TELEPHONE ENCOUNTER
----- Message from Sadia Mathis MA sent at 4/2/2021  1:46 PM EDT -----  Regarding: FW: Non-Urgent Medical Question  Contact: [phone]    ----- Message -----  From: Uche Ramos  Sent: 4/2/2021   1:44 PM EDT  To: Domo Shoemaker [facility]  Subject: Non-Urgent Medical Question                      Dr. Pastrana,  If you could give me a call at [phone] I would greatly appreciate it. Thanks, Jose

## 2021-04-02 NOTE — TELEPHONE ENCOUNTER
I called -- he had a question about his oldest sister who has mitral valve disease and needs to be seen.     Lashawn Adams  He doesn't know her   Her home phone # is: [phone]  Her cell # is: [phone]    Please call patient and see who she has been seeing in [place].  I will need their OV note and the echo she had done at the hospital there on a disc before she arrives. We can add her to [facility] schedule on .

## 2021-04-02 NOTE — ADDENDUM NOTE
Addended by: WATSON MAYEN on: 4/2/2021 04:15 PM     Modules accepted: Level of Service, SmartSet

## 2021-06-04 ENCOUNTER — TELEPHONE (OUTPATIENT)
Dept: CARDIOLOGY | Facility: CLINIC | Age: 71
End: 2021-06-04

## 2021-06-04 NOTE — TELEPHONE ENCOUNTER
----- Message from Sadia Mathis MA sent at 6/4/2021  2:07 PM EDT -----  Regarding: FW: Non-Urgent Medical Question  Contact: [phone]    ----- Message -----  From: Uche Ramos  Sent: 6/4/2021   1:48 PM EDT  To: Domo Shoemaker [facility]  Subject: Non-Urgent Medical Question                      Dr. Pastrana  I would appreciate it if you could give me a call for a sec. not urgent.   [phone]    Hoang Garcia

## 2021-10-20 NOTE — PROGRESS NOTES
RM:________     PCP: Joo Barker MD    : 1950  AGE: 71 y.o.  EST PATIENT   REASON FOR VISIT/  CC:    BP Readings from Last 3 Encounters:   20 120/84   19 118/74   18 124/60        WT: ____________ BP: __________L __________R HR______    CHEST PAIN: _____________    SOA: _____________PALPS: _______________     LIGHTHEADED: ___________FATIGUE: ________________ EDEMA __________    ALLERGIES:Codeine and Other SMOKING HISTORY:  Social History     Tobacco Use   • Smoking status: Former Smoker     Quit date: 1981     Years since quittin.8   • Smokeless tobacco: Never Used   • Tobacco comment: Caffeine use: 1 CUP DAILY.   Substance Use Topics   • Alcohol use: Yes     Alcohol/week: 2.0 standard drinks     Types: 2 Cans of beer per week     Comment: 8/week/ social use   • Drug use: No     CAFFEINE USE_________________  ALCOHOL ______________________    Below is the patient's most recent value for Albumin, ALT, AST, BUN, Calcium, Chloride, Cholesterol, CO2, Creatinine, GFR, Glucose, HDL, Hematocrit, Hemoglobin, Hemoglobin A1C, LDL, Magnesium, Phosphorus, Platelets, Potassium, PSA, Sodium, Triglycerides, TSH and WBC.   Lab Results   Component Value Date    ALBUMIN 4.4 07/15/2020    ALT 26 07/15/2020    AST 27 07/15/2020    BUN 13 07/15/2020    CALCIUM 9.7 07/15/2020     07/15/2020    CO2 25 07/15/2020    CREATININE 0.7 07/15/2020    GLU 86 07/15/2020    HDL 48 07/15/2020    HCT 44.7 2019    HGB 14.1 2019    HGBA1C 5.2 2019    LDL 62 07/15/2020    MG 2.4 2015     2019    K 4.9 07/15/2020    PSA 0.55 2019     07/15/2020    TRIG 126 07/15/2020    TSH 0.830 07/15/2020    WBC 6.88 2019          NEW DIAGNOSIS/ SURGERY/ HOSP OR ED VISITS: ______________________    __________________________________________________________________      RECENT LABS OR DIAGNOSTIC TESTING:   _____________________________    __________________________________________________________________      ASSESSMENT/ PLAN: _______________________________________________    __________________________________________________________________

## 2021-10-28 ENCOUNTER — OFFICE VISIT (OUTPATIENT)
Dept: CARDIOLOGY | Facility: CLINIC | Age: 71
End: 2021-10-28

## 2021-10-28 VITALS
DIASTOLIC BLOOD PRESSURE: 70 MMHG | BODY MASS INDEX: 27.81 KG/M2 | HEIGHT: 69 IN | WEIGHT: 187.8 LBS | HEART RATE: 63 BPM | SYSTOLIC BLOOD PRESSURE: 118 MMHG

## 2021-10-28 DIAGNOSIS — Z98.890 HISTORY OF MITRAL VALVE REPAIR: Primary | ICD-10-CM

## 2021-10-28 DIAGNOSIS — I10 PRIMARY HYPERTENSION: ICD-10-CM

## 2021-10-28 PROCEDURE — 93000 ELECTROCARDIOGRAM COMPLETE: CPT | Performed by: INTERNAL MEDICINE

## 2021-10-28 PROCEDURE — 99213 OFFICE O/P EST LOW 20 MIN: CPT | Performed by: INTERNAL MEDICINE

## 2021-10-28 RX ORDER — AMOXICILLIN 500 MG/1
2000 CAPSULE ORAL SEE ADMIN INSTRUCTIONS
Qty: 4 CAPSULE | Refills: 3 | Status: SHIPPED | OUTPATIENT
Start: 2021-10-28 | End: 2022-10-31 | Stop reason: SDUPTHER

## 2021-10-28 RX ORDER — SILDENAFIL CITRATE 20 MG/1
20-60 TABLET ORAL
COMMUNITY
Start: 2021-07-22

## 2021-10-28 NOTE — PROGRESS NOTES
Date of Office Visit: 10/28/2021  Encounter Provider: Alek Pastrana MD  Place of Service: [facility] CARDIOLOGY  Patient Name: Uche Ramos  :1950    Chief Complaint   Patient presents with   • Cardiac Valve Problem   :     HPI: Uche Ramos is a 71 y.o. male who presents today to follow-up. I have reviewed prior notes and there are no changes except for any new updates described below. I have also reviewed any information entered into the medical record by the patient or by ancillary staff.     He has a history of mitral regurgitation/prolapse and underwent mitral valve repair in 2014. He had a very brief episode of paroxysmal atrial fibrillation postoperatively. 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 He has completely recovered and feels very well. He denies any palpitations, lightheadedness, edema, dyspnea, or chest pain. His coronaries were normal prior to surgery.     A surveillance echo in  showed mild MR and was otherwise normal.    Past Medical History:   Diagnosis Date   • Anemia    • Basal cell carcinoma    • History of colonoscopy     DR. CEDENO   • Hypercholesterolemia    • Hypertension    • Hypothyroidism    • Lipoma    • Mitral valve insufficiency     3/2014: mod-severe, with moderate LAE and mild LVE, due to prolapse; s/p repair with #31 ATS simulus band and 4-0 Goretex neochordae to P2.  Echo 2014 completely normal (with evidence of prior repair)   • Parapneumonic effusion     pneumonia and post-pericardiotomy syndrome, 2014, requiring VATS/decortication   • Postoperative atrial fibrillation (HCC)     after open heart surgery, no recurrence   • Recurrent left pleural effusion        Past Surgical History:   Procedure Laterality Date   • BRONCHOSCOPY     • COLONOSCOPY     • KNEE SURGERY     • MAZE PROCEDURE       Pulmonary venous ablation with cryoablation 14, Dr Sales   • MITRAL VALVE REPAIR/REPLACEMENT      Mitral Valve Repair 14, Dr Ram   • THORACOSCOPY W/ TALC PLEURODESIS         Social History     Socioeconomic History   • Marital status:    Tobacco Use   • Smoking status: Former Smoker     Quit date: 1981     Years since quittin.8   • Smokeless tobacco: Never Used   • Tobacco comment: Caffeine use: 1 CUP DAILY.   Vaping Use   • Vaping Use: Never used   Substance and Sexual Activity   • Alcohol use: Yes     Alcohol/week: 2.0 standard drinks     Types: 2 Cans of beer per week     Comment: 8/week/ social use   • Drug use: No   • Sexual activity: Defer       Family History   Problem Relation Age of Onset   • Hypertension Mother    • Diabetes Mother    • Stroke Mother    • Hypertension Father    • Other Father         cardiac disorder  some type of heart disease in his 80's (? if valvular, conduction, CAD, or CHF)   • Stroke Other        Review of Systems   Musculoskeletal: Positive for arthritis.   All other systems reviewed and are negative.      Allergies   Allergen Reactions   • Codeine    • Other      ANESTHESIA IV SET MISC         Current Outpatient Medications:   •  aspirin 81 MG EC tablet, Take  by mouth every other day., Disp: , Rfl:   •  atorvastatin (LIPITOR) 40 MG tablet, TAKE ONE TABLET BY MOUTH ONCE DAILY, Disp: 90 tablet, Rfl: 1  •  desonide (DESOWEN) 0.05 % lotion, Apply topically., Disp: , Rfl:   •  levothyroxine (SYNTHROID, LEVOTHROID) 100 MCG tablet, Daily., Disp: , Rfl:   •  lisinopril (PRINIVIL,ZESTRIL) 20 MG tablet, TAKE ONE TABLET BY MOUTH ONCE DAILY, Disp: 90 tablet, Rfl: 1  •  sildenafil (REVATIO) 20 MG tablet, Take 20-60 mg by mouth., Disp: , Rfl:   •  amoxicillin (AMOXIL) 500 MG capsule, Take 4 capsules by mouth See Admin Instructions. 4 capsules 1 hour prior to procedure, Disp: 12 capsule, Rfl: 0     Objective:     Vitals:    10/28/21 1032   BP: 118/70   BP Location:  "Left arm   Pulse: 63   Weight: 85.2 kg (187 lb 12.8 oz)   Height: 175.3 cm (69\")     Body mass index is 27.73 kg/m².    Physical Exam  Vitals reviewed.   Constitutional:       Appearance: He is well-developed.   HENT:      Head: Normocephalic.      Nose: Nose normal.      Mouth/Throat:      Comments: masked  Eyes:      Conjunctiva/sclera: Conjunctivae normal.   Neck:      Vascular: No JVD.   Cardiovascular:      Rate and Rhythm: Normal rate and regular rhythm.      Pulses: Normal pulses and intact distal pulses.      Heart sounds: Normal heart sounds. No murmur heard.      Pulmonary:      Effort: Pulmonary effort is normal.      Breath sounds: Normal breath sounds.   Abdominal:      Palpations: Abdomen is soft.      Tenderness: There is no abdominal tenderness.   Musculoskeletal:         General: Normal range of motion.      Cervical back: Normal range of motion.   Skin:     General: Skin is warm and dry.      Findings: No rash.   Neurological:      General: No focal deficit present.      Mental Status: He is alert and oriented to person, place, and time.      Cranial Nerves: No cranial nerve deficit.   Psychiatric:         Mood and Affect: Mood normal.         Behavior: Behavior normal.         Thought Content: Thought content normal.           ECG 12 Lead    Date/Time: 10/28/2021 10:52 AM  Performed by: Alek Pastrana MD  Authorized by: Alek Pastrana MD   Comparison: compared with previous ECG   Similar to previous ECG  Rhythm: sinus rhythm  Conduction: conduction normal  ST Segments: ST segments normal  T Waves: T waves normal  QRS axis: left  Other: no other findings    Clinical impression: non-specific ECG            Assessment:       Diagnosis Plan   1. History of mitral valve repair     2. Primary hypertension       Plan:       1.  He has done very well.  He had a surveillance echo in 2020 that looked great; we'll repeat that in 2025 in the absence of symptoms or changes in exam.    He takes amoxicillin for " SBE prophylaxis prior to dental procedures.     2.  His BP is very well controlled.      Sincerely,       Alek Pastrana MD

## 2022-10-31 ENCOUNTER — OFFICE VISIT (OUTPATIENT)
Dept: CARDIOLOGY | Facility: CLINIC | Age: 72
End: 2022-10-31

## 2022-10-31 VITALS
BODY MASS INDEX: 28.29 KG/M2 | WEIGHT: 191 LBS | HEART RATE: 58 BPM | HEIGHT: 69 IN | DIASTOLIC BLOOD PRESSURE: 70 MMHG | SYSTOLIC BLOOD PRESSURE: 120 MMHG

## 2022-10-31 DIAGNOSIS — Z98.890 HISTORY OF MITRAL VALVE REPAIR: Primary | ICD-10-CM

## 2022-10-31 DIAGNOSIS — I10 PRIMARY HYPERTENSION: ICD-10-CM

## 2022-10-31 PROCEDURE — 99214 OFFICE O/P EST MOD 30 MIN: CPT | Performed by: INTERNAL MEDICINE

## 2022-10-31 PROCEDURE — 93000 ELECTROCARDIOGRAM COMPLETE: CPT | Performed by: INTERNAL MEDICINE

## 2022-10-31 RX ORDER — AMOXICILLIN 500 MG/1
2000 CAPSULE ORAL SEE ADMIN INSTRUCTIONS
Qty: 4 CAPSULE | Refills: 3 | Status: SHIPPED | OUTPATIENT
Start: 2022-10-31

## 2022-10-31 NOTE — PROGRESS NOTES
Date of Office Visit: 10/31/2022  Encounter Provider: Alek Pastrana MD  Place of Service: [facility] CARDIOLOGY  Patient Name: Uche Ramos  :1950    Chief Complaint   Patient presents with   • History of mitral valve repair   :     HPI: Uche Ramos is a 72 y.o. male who presents today to follow-up. I have reviewed prior notes and there are no changes except for any new updates described below. I have also reviewed any information entered into the medical record by the patient or by ancillary staff.     He has a history of mitral regurgitation/prolapse and underwent mitral valve repair in 2014. He had a very brief episode of paroxysmal atrial fibrillation postoperatively. 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 He has completely recovered and feels very well. He denies any palpitations, lightheadedness, edema, dyspnea, or chest pain. His coronaries were normal prior to surgery.     A surveillance echo in  showed mild MR and was otherwise normal.    Past Medical History:   Diagnosis Date   • Anemia    • Basal cell carcinoma    • History of colonoscopy     DR. CEDENO   • Hypercholesterolemia    • Hypertension    • Hypothyroidism    • Lipoma    • Mitral valve insufficiency     3/2014: mod-severe, with moderate LAE and mild LVE, due to prolapse; s/p repair with #31 ATS simulus band and 4-0 Goretex neochordae to P2.  Echo 2014 completely normal (with evidence of prior repair)   • Mitral valve prolapse    • Parapneumonic effusion     pneumonia and post-pericardiotomy syndrome, 2014, requiring VATS/decortication   • Postoperative atrial fibrillation (HCC)     after open heart surgery, no recurrence   • Recurrent left pleural effusion        Past Surgical History:   Procedure Laterality Date   • ABLATION OF DYSRHYTHMIC FOCUS     •  BRONCHOSCOPY     • CARDIAC CATHETERIZATION     • CARDIAC VALVE REPLACEMENT     • COLONOSCOPY     • KNEE SURGERY     • MAZE PROCEDURE      Pulmonary venous ablation with cryoablation 14, Dr Sales   • MITRAL VALVE REPAIR/REPLACEMENT      Mitral Valve Repair 14, Dr Ram   • THORACOSCOPY W/ TALC PLEURODESIS         Social History     Socioeconomic History   • Marital status:    Tobacco Use   • Smoking status: Former     Packs/day: 1.00     Years: 15.00     Pack years: 15.00     Types: Cigarettes     Quit date: 1981     Years since quittin.8   • Smokeless tobacco: Never   • Tobacco comments:     Caffeine use: 1 CUP DAILY.   Vaping Use   • Vaping Use: Never used   Substance and Sexual Activity   • Alcohol use: Yes     Alcohol/week: 2.0 standard drinks     Types: 2 Cans of beer per week     Comment: 8/week/ social use   • Drug use: No   • Sexual activity: Yes     Partners: Female     Birth control/protection: Surgical       Family History   Problem Relation Age of Onset   • Hypertension Mother    • Diabetes Mother    • Stroke Mother    • Hypertension Father    • Other Father         cardiac disorder  some type of heart disease in his 80's (? if valvular, conduction, CAD, or CHF)   • Stroke Other        Review of Systems   Musculoskeletal: Positive for arthritis.   All other systems reviewed and are negative.      Allergies   Allergen Reactions   • Codeine    • Other      ANESTHESIA IV SET MISC         Current Outpatient Medications:   •  aspirin 81 MG EC tablet, Take  by mouth every other day., Disp: , Rfl:   •  atorvastatin (LIPITOR) 40 MG tablet, TAKE ONE TABLET BY MOUTH ONCE DAILY, Disp: 90 tablet, Rfl: 1  •  desonide (DESOWEN) 0.05 % lotion, Apply topically., Disp: , Rfl:   •  levothyroxine (SYNTHROID, LEVOTHROID) 100 MCG tablet, Daily., Disp: , Rfl:   •  lisinopril (PRINIVIL,ZESTRIL) 20 MG tablet, TAKE ONE TABLET BY MOUTH ONCE DAILY, Disp: 90 tablet, Rfl: 1  •  sildenafil (REVATIO) 20 MG  "tablet, Take 20-60 mg by mouth., Disp: , Rfl:   •  amoxicillin (AMOXIL) 500 MG capsule, Take 4 capsules by mouth See Admin Instructions. 4 capsules 1 hour prior to procedure, Disp: 4 capsule, Rfl: 3     Objective:     Vitals:    10/31/22 1123   BP: 120/70   Pulse: 58   Weight: 86.6 kg (191 lb)   Height: 175.3 cm (69\")     Body mass index is 28.21 kg/m².    Physical Exam  Vitals reviewed.   Constitutional:       Appearance: He is well-developed.   HENT:      Head: Normocephalic.      Nose: Nose normal.      Mouth/Throat:      Comments: masked  Eyes:      Conjunctiva/sclera: Conjunctivae normal.   Neck:      Vascular: No JVD.   Cardiovascular:      Rate and Rhythm: Normal rate and regular rhythm.      Pulses: Normal pulses and intact distal pulses.      Heart sounds: Normal heart sounds. No murmur heard.  Pulmonary:      Effort: Pulmonary effort is normal.      Breath sounds: Normal breath sounds.   Abdominal:      Palpations: Abdomen is soft.      Tenderness: There is no abdominal tenderness.   Musculoskeletal:         General: Normal range of motion.      Cervical back: Normal range of motion.   Skin:     General: Skin is warm and dry.      Findings: No rash.   Neurological:      General: No focal deficit present.      Mental Status: He is alert and oriented to person, place, and time.      Cranial Nerves: No cranial nerve deficit.   Psychiatric:         Mood and Affect: Mood normal.         Behavior: Behavior normal.         Thought Content: Thought content normal.           ECG 12 Lead    Date/Time: 10/31/2022 11:35 AM  Performed by: Alek Pastrana MD  Authorized by: Alek Pastrana MD   Comparison: compared with previous ECG   Similar to previous ECG  Rhythm: sinus rhythm  Conduction: conduction normal  ST Segments: ST segments normal  T Waves: T waves normal  QRS axis: normal  Other: no other findings    Clinical impression: normal ECG            Assessment:       Diagnosis Plan   1. History of mitral valve " repair        2. Primary hypertension          Plan:       1.  He has done very well.  He had a surveillance echo in 2020 that looked great; we'll repeat that in 2023.    He takes amoxicillin for SBE prophylaxis prior to dental procedures.     2.  His BP is very well controlled.      Sincerely,       Alek Pastrana MD

## 2023-12-01 NOTE — PROGRESS NOTES
RM:________     PCP: Venkat Covarrubias    : 1950  AGE: 73 y.o.  EST PATIENT     REASON FOR VISIT/  CC:        BP Readings from Last 3 Encounters:   10/31/22 120/70   10/28/21 118/70   20 120/84      Wt Readings from Last 3 Encounters:   10/31/22 86.6 kg (191 lb)   10/28/21 85.2 kg (187 lb 12.8 oz)   20 82.6 kg (182 lb)        WT: ____________ BP: __________L __________R HR______    CHEST PAIN: _____________    SOA: _____________PALPS: _______________     LIGHTHEADED: ___________FATIGUE: ________________ EDEMA __________    ALLERGIES:Codeine and Other SMOKING HISTORY:  Social History     Tobacco Use    Smoking status: Former     Packs/day: 1.00     Years: 15.00     Additional pack years: 0.00     Total pack years: 15.00     Types: Cigarettes     Quit date: 1981     Years since quittin.9    Smokeless tobacco: Never    Tobacco comments:     Caffeine use: 1 CUP DAILY.   Vaping Use    Vaping Use: Never used   Substance Use Topics    Alcohol use: Yes     Alcohol/week: 2.0 standard drinks of alcohol     Types: 2 Cans of beer per week     Comment: 8/week/ social use    Drug use: No     CAFFEINE USE_________________  ALCOHOL ______________________

## 2023-12-06 ENCOUNTER — HOSPITAL ENCOUNTER (OUTPATIENT)
Dept: CARDIOLOGY | Facility: HOSPITAL | Age: 73
Discharge: HOME | End: 2023-12-06
Admitting: INTERNAL MEDICINE
Payer: MEDICARE

## 2023-12-06 ENCOUNTER — OFFICE VISIT (OUTPATIENT)
Dept: CARDIOLOGY | Facility: CLINIC | Age: 73
End: 2023-12-06
Payer: MEDICARE

## 2023-12-06 VITALS
SYSTOLIC BLOOD PRESSURE: 136 MMHG | BODY MASS INDEX: 28.29 KG/M2 | DIASTOLIC BLOOD PRESSURE: 82 MMHG | HEIGHT: 69 IN | WEIGHT: 191 LBS | HEART RATE: 79 BPM

## 2023-12-06 VITALS
DIASTOLIC BLOOD PRESSURE: 78 MMHG | SYSTOLIC BLOOD PRESSURE: 110 MMHG | WEIGHT: 190 LBS | BODY MASS INDEX: 28.14 KG/M2 | HEIGHT: 69 IN | HEART RATE: 59 BPM

## 2023-12-06 DIAGNOSIS — Z98.890 HISTORY OF MITRAL VALVE REPAIR: ICD-10-CM

## 2023-12-06 DIAGNOSIS — I10 PRIMARY HYPERTENSION: ICD-10-CM

## 2023-12-06 DIAGNOSIS — Z98.890 HISTORY OF MITRAL VALVE REPAIR: Primary | ICD-10-CM

## 2023-12-06 LAB
AORTIC ARCH: 2.4 CM
ASCENDING AORTA: 3.7 CM
BH CV ECHO MEAS - ACS: 1.89 CM
BH CV ECHO MEAS - AO MAX PG: 5.7 MMHG
BH CV ECHO MEAS - AO MEAN PG: 3.6 MMHG
BH CV ECHO MEAS - AO ROOT DIAM: 3.7 CM
BH CV ECHO MEAS - AO V2 MAX: 119.9 CM/SEC
BH CV ECHO MEAS - AO V2 VTI: 29.1 CM
BH CV ECHO MEAS - AVA(I,D): 2.44 CM2
BH CV ECHO MEAS - EDV(CUBED): 117.6 ML
BH CV ECHO MEAS - EDV(MOD-SP2): 111 ML
BH CV ECHO MEAS - EDV(MOD-SP4): 100 ML
BH CV ECHO MEAS - EF(MOD-BP): 57.7 %
BH CV ECHO MEAS - EF(MOD-SP2): 59.5 %
BH CV ECHO MEAS - EF(MOD-SP4): 56 %
BH CV ECHO MEAS - ESV(CUBED): 19.7 ML
BH CV ECHO MEAS - ESV(MOD-SP2): 45 ML
BH CV ECHO MEAS - ESV(MOD-SP4): 44 ML
BH CV ECHO MEAS - FS: 44.9 %
BH CV ECHO MEAS - IVS/LVPW: 1.3 CM
BH CV ECHO MEAS - IVSD: 1.3 CM
BH CV ECHO MEAS - LAT PEAK E' VEL: 7.3 CM/SEC
BH CV ECHO MEAS - LV DIASTOLIC VOL/BSA (35-75): 49.4 CM2
BH CV ECHO MEAS - LV MASS(C)D: 213.3 GRAMS
BH CV ECHO MEAS - LV MAX PG: 2.8 MMHG
BH CV ECHO MEAS - LV MEAN PG: 2 MMHG
BH CV ECHO MEAS - LV SYSTOLIC VOL/BSA (12-30): 21.7 CM2
BH CV ECHO MEAS - LV V1 MAX: 83.6 CM/SEC
BH CV ECHO MEAS - LV V1 VTI: 18.3 CM
BH CV ECHO MEAS - LVIDD: 4.9 CM
BH CV ECHO MEAS - LVIDS: 2.7 CM
BH CV ECHO MEAS - LVOT AREA: 3.9 CM2
BH CV ECHO MEAS - LVOT DIAM: 2.22 CM
BH CV ECHO MEAS - LVPWD: 1 CM
BH CV ECHO MEAS - MED PEAK E' VEL: 5.5 CM/SEC
BH CV ECHO MEAS - MR MAX PG: 142.7 MMHG
BH CV ECHO MEAS - MR MAX VEL: 597.2 CM/SEC
BH CV ECHO MEAS - MV A DUR: 0.12 SEC
BH CV ECHO MEAS - MV A MAX VEL: 87.4 CM/SEC
BH CV ECHO MEAS - MV DEC SLOPE: 307.9 CM/SEC2
BH CV ECHO MEAS - MV DEC TIME: 0.35 SEC
BH CV ECHO MEAS - MV E MAX VEL: 112 CM/SEC
BH CV ECHO MEAS - MV E/A: 1.28
BH CV ECHO MEAS - MV MAX PG: 6.5 MMHG
BH CV ECHO MEAS - MV MEAN PG: 2.38 MMHG
BH CV ECHO MEAS - MV P1/2T: 126.3 MSEC
BH CV ECHO MEAS - MV V2 VTI: 41.4 CM
BH CV ECHO MEAS - MVA(P1/2T): 1.74 CM2
BH CV ECHO MEAS - MVA(VTI): 1.71 CM2
BH CV ECHO MEAS - PA ACC TIME: 0.12 SEC
BH CV ECHO MEAS - PA V2 MAX: 75.6 CM/SEC
BH CV ECHO MEAS - PULM A REVS DUR: 0.08 SEC
BH CV ECHO MEAS - PULM A REVS VEL: 23.3 CM/SEC
BH CV ECHO MEAS - PULM DIAS VEL: 46.2 CM/SEC
BH CV ECHO MEAS - PULM S/D: 0.84
BH CV ECHO MEAS - PULM SYS VEL: 38.6 CM/SEC
BH CV ECHO MEAS - QP/QS: 0.83
BH CV ECHO MEAS - RAP SYSTOLE: 3 MMHG
BH CV ECHO MEAS - RV MAX PG: 1.32 MMHG
BH CV ECHO MEAS - RV V1 MAX: 57.4 CM/SEC
BH CV ECHO MEAS - RV V1 VTI: 13.6 CM
BH CV ECHO MEAS - RVOT DIAM: 2.35 CM
BH CV ECHO MEAS - RVSP: 28 MMHG
BH CV ECHO MEAS - SI(MOD-SP2): 32.6 ML/M2
BH CV ECHO MEAS - SI(MOD-SP4): 27.6 ML/M2
BH CV ECHO MEAS - SUP REN AO DIAM: 2.4 CM
BH CV ECHO MEAS - SV(LVOT): 70.9 ML
BH CV ECHO MEAS - SV(MOD-SP2): 66 ML
BH CV ECHO MEAS - SV(MOD-SP4): 56 ML
BH CV ECHO MEAS - SV(RVOT): 58.9 ML
BH CV ECHO MEAS - TAPSE (>1.6): 1.29 CM
BH CV ECHO MEAS - TR MAX PG: 24.5 MMHG
BH CV ECHO MEAS - TR MAX VEL: 247.6 CM/SEC
BH CV ECHO MEASUREMENTS AVERAGE E/E' RATIO: 17.5
BH CV XLRA - RV BASE: 3.2 CM
BH CV XLRA - RV LENGTH: 6.7 CM
BH CV XLRA - RV MID: 2.9 CM
BH CV XLRA - TDI S': 10.3 CM/SEC
LEFT ATRIUM VOLUME INDEX: 36 ML/M2
SINUS: 3.8 CM
STJ: 3.2 CM

## 2023-12-06 PROCEDURE — 93306 TTE W/DOPPLER COMPLETE: CPT

## 2023-12-06 NOTE — PROGRESS NOTES
Date of Office Visit: 2023  Encounter Provider: Alek Pastrana MD  Place of Service: [facility] CARDIOLOGY  Patient Name: Uche Ramos  :1950    Chief Complaint   Patient presents with    History of mitral valve repair   :     HPI: Uche Ramos is a 73 y.o. male who presents today to follow-up. I have reviewed prior notes and there are no changes except for any new updates described below. I have also reviewed any information entered into the medical record by the patient or by ancillary staff.     He has a history of mitral regurgitation/prolapse and underwent mitral valve repair in 2014. He had a very brief episode of paroxysmal atrial fibrillation postoperatively. 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 He has completely recovered and feels very well. He denies any palpitations, lightheadedness, edema, dyspnea, or chest pain. His coronaries were normal prior to surgery.     A surveillance echo in  showed mild MR.    Past Medical History:   Diagnosis Date    Anemia     Basal cell carcinoma     History of colonoscopy     DR. CEDENO    Hypercholesterolemia     Hypertension     Hypothyroidism     Lipoma     Mitral valve insufficiency     3/2014: mod-severe, with moderate LAE and mild LVE, due to prolapse; s/p repair with #31 ATS simulus band and 4-0 Goretex neochordae to P2.  Echo 2014 completely normal (with evidence of prior repair)    Mitral valve prolapse     Parapneumonic effusion     pneumonia and post-pericardiotomy syndrome, 2014, requiring VATS/decortication    Postoperative atrial fibrillation     after open heart surgery, no recurrence    Recurrent left pleural effusion        Past Surgical History:   Procedure Laterality Date    ABLATION OF DYSRHYTHMIC FOCUS      BRONCHOSCOPY      CARDIAC CATHETERIZATION       CARDIAC VALVE REPLACEMENT      COLONOSCOPY      KNEE SURGERY      MAZE PROCEDURE      Pulmonary venous ablation with cryoablation 14, Dr Sales    MITRAL VALVE REPAIR/REPLACEMENT      Mitral Valve Repair 14, Dr Ram    THORACOSCOPY W/ TALC PLEURODESIS         Social History     Socioeconomic History    Marital status:    Tobacco Use    Smoking status: Former     Packs/day: 1.00     Years: 15.00     Additional pack years: 0.00     Total pack years: 15.00     Types: Cigarettes     Quit date: 1981     Years since quittin.9     Passive exposure: Past    Smokeless tobacco: Never    Tobacco comments:     Caffeine use: 1 CUP DAILY.   Vaping Use    Vaping Use: Never used   Substance and Sexual Activity    Alcohol use: Yes     Alcohol/week: 2.0 standard drinks of alcohol     Types: 2 Cans of beer per week     Comment: 8/week/ social use    Drug use: No    Sexual activity: Yes     Partners: Female     Birth control/protection: Surgical       Family History   Problem Relation Age of Onset    Hypertension Mother     Diabetes Mother     Stroke Mother     Hypertension Father     Other Father         cardiac disorder  some type of heart disease in his 80's (? if valvular, conduction, CAD, or CHF)    Heart attack Father     Heart disease Father     Stroke Other        Review of Systems   Cardiovascular:  Negative for chest pain.   Respiratory:  Negative for shortness of breath.    Musculoskeletal:  Positive for arthritis.   All other systems reviewed and are negative.      Allergies   Allergen Reactions    Codeine     Other      ANESTHESIA IV SET MISC         Current Outpatient Medications:     amoxicillin (AMOXIL) 500 MG capsule, Take 4 capsules by mouth See Admin Instructions. 4 capsules 1 hour prior to procedure, Disp: 4 capsule, Rfl: 3    aspirin 81 MG EC tablet, Take  by mouth every other day., Disp: , Rfl:     atorvastatin (LIPITOR) 40 MG tablet, TAKE ONE TABLET BY MOUTH ONCE DAILY, Disp: 90 tablet,  "Rfl: 1    desonide (DESOWEN) 0.05 % lotion, Apply topically., Disp: , Rfl:     levothyroxine (SYNTHROID, LEVOTHROID) 100 MCG tablet, Daily., Disp: , Rfl:     lisinopril (PRINIVIL,ZESTRIL) 20 MG tablet, TAKE ONE TABLET BY MOUTH ONCE DAILY, Disp: 90 tablet, Rfl: 1    sildenafil (REVATIO) 20 MG tablet, Take 1-3 tablets by mouth., Disp: , Rfl:      Objective:     Vitals:    12/06/23 1301   BP: 110/78   BP Location: Right arm   Pulse: 59   Weight: 86.2 kg (190 lb)   Height: 175.3 cm (69\")     Body mass index is 28.06 kg/m².    Physical Exam  Vitals reviewed.   Constitutional:       Appearance: He is well-developed.   HENT:      Head: Normocephalic.      Nose: Nose normal.      Mouth/Throat:      Pharynx: Oropharynx is clear.   Eyes:      Conjunctiva/sclera: Conjunctivae normal.   Neck:      Vascular: No JVD.   Cardiovascular:      Rate and Rhythm: Normal rate and regular rhythm.      Pulses: Normal pulses and intact distal pulses.      Heart sounds: Normal heart sounds. No murmur heard.  Pulmonary:      Effort: Pulmonary effort is normal.      Breath sounds: Normal breath sounds.   Abdominal:      Palpations: Abdomen is soft.      Tenderness: There is no abdominal tenderness.   Musculoskeletal:         General: Normal range of motion.      Cervical back: Normal range of motion.   Skin:     General: Skin is warm and dry.      Findings: No rash.   Neurological:      General: No focal deficit present.      Mental Status: He is alert and oriented to person, place, and time.      Cranial Nerves: No cranial nerve deficit.   Psychiatric:         Mood and Affect: Mood normal.         Behavior: Behavior normal.         Thought Content: Thought content normal.           ECG 12 Lead    Date/Time: 12/6/2023 1:21 PM  Performed by: Alek Pastrana MD    Authorized by: Alek Pastrana MD  Comparison: compared with previous ECG   Similar to previous ECG  Rhythm: sinus rhythm  Conduction: conduction normal  ST Segments: ST segments " normal  T Waves: T waves normal  QRS axis: left  Other: no other findings    Clinical impression: non-specific ECG          Assessment:       Diagnosis Plan   1. History of mitral valve repair        2. Primary hypertension            Plan:       1.  He has done very well.  He had a surveillance echo today that looks great. We'll repeat an echo in 2026.  He takes amoxicillin for SBE prophylaxis prior to dental procedures.     2.  His BP is very well controlled.        Sincerely,       Alek Pastrana MD

## 2024-11-11 ENCOUNTER — TELEPHONE (OUTPATIENT)
Dept: CARDIOLOGY | Facility: CLINIC | Age: 74
End: 2024-11-11
Payer: MEDICARE

## 2024-11-11 RX ORDER — AMOXICILLIN 500 MG/1
2000 CAPSULE ORAL SEE ADMIN INSTRUCTIONS
Qty: 4 CAPSULE | Refills: 3 | Status: SHIPPED | OUTPATIENT
Start: 2024-11-11

## 2024-11-11 NOTE — TELEPHONE ENCOUNTER
Caller: Uche Ramos    Relationship: Self    Best call back number: [phone]      What was the call regarding: PATIENT STATED THAT HE HAS DENTAL PROCEDURE ON 11.13.24 AND NEEDS DR SEGURA TO SEND IN A PRESCRIPTION FOR ANTIBIOTICS. PLEASE ADVISE.

## 2024-11-11 NOTE — TELEPHONE ENCOUNTER
Pre med is Amox per Dr. Pastrana.     Sent in dose of Amox and called pt to inform him of the instructions.  Pt verbalized understanding.

## 2024-12-02 NOTE — PROGRESS NOTES
RM:________     PCP: Daniel Melvin MD    : 1950  AGE: 74 y.o.  EST PATIENT     REASON FOR VISIT/  CC:        BP Readings from Last 3 Encounters:   23 136/82   23 110/78   10/31/22 120/70      Wt Readings from Last 3 Encounters:   23 86.6 kg (191 lb)   23 86.2 kg (190 lb)   10/31/22 86.6 kg (191 lb)        WT: ____________ BP: __________L __________R HR______    CHEST PAIN: _____________    SOA: _____________PALPS: _______________     LIGHTHEADED: ___________FATIGUE: ________________ EDEMA __________    ALLERGIES:Codeine and Other SMOKING HISTORY:  Social History     Tobacco Use    Smoking status: Former     Current packs/day: 0.00     Average packs/day: 1 pack/day for 15.0 years (15.0 ttl pk-yrs)     Types: Cigarettes     Start date: 1966     Quit date: 1981     Years since quittin.9     Passive exposure: Past    Smokeless tobacco: Never    Tobacco comments:     Caffeine use: 1 CUP DAILY.   Vaping Use    Vaping status: Never Used   Substance Use Topics    Alcohol use: Yes     Alcohol/week: 2.0 standard drinks of alcohol     Types: 2 Cans of beer per week     Comment: 8/week/ social use    Drug use: No     CAFFEINE USE_________________  ALCOHOL ______________________

## 2024-12-11 ENCOUNTER — OFFICE VISIT (OUTPATIENT)
Dept: CARDIOLOGY | Facility: CLINIC | Age: 74
End: 2024-12-11
Payer: MEDICARE

## 2024-12-11 VITALS
WEIGHT: 193 LBS | DIASTOLIC BLOOD PRESSURE: 80 MMHG | HEIGHT: 69 IN | SYSTOLIC BLOOD PRESSURE: 126 MMHG | BODY MASS INDEX: 28.58 KG/M2 | OXYGEN SATURATION: 98 % | HEART RATE: 66 BPM

## 2024-12-11 DIAGNOSIS — I10 PRIMARY HYPERTENSION: ICD-10-CM

## 2024-12-11 DIAGNOSIS — Z98.890 HISTORY OF MITRAL VALVE REPAIR: Primary | ICD-10-CM

## 2024-12-11 NOTE — PROGRESS NOTES
Date of Office Visit: 2024  Encounter Provider: Alek Pastrana MD  Place of Service: [facility]  Patient Name: Uche Ramos  :1950    Chief Complaint   Patient presents with    Follow-up   :     HPI: Uche Ramos is a 74 y.o. male who presents today to follow-up. I have reviewed prior notes and there are no changes except for any new updates described below. I have also reviewed any information entered into the medical record by the patient or by ancillary staff.     He has a history of mitral regurgitation/prolapse and underwent mitral valve repair in 2014. He had a very brief episode of paroxysmal atrial fibrillation postoperatively. 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 He has completely recovered and feels very well. He denies any palpitations, lightheadedness, edema, dyspnea, or chest pain. His coronaries were normal prior to surgery.     A surveillance echo in  showed mild MR and was otherwise normal.    Past Medical History:   Diagnosis Date    Anemia     Basal cell carcinoma     History of colonoscopy     DR. CEDENO    History of mitral valve repair 2016    Hypercholesterolemia     Hypertension     Hypothyroidism     Lipoma     Mitral valve insufficiency     3/2014: mod-severe, with moderate LAE and mild LVE, due to prolapse; s/p repair with #31 ATS simulus band and 4-0 Goretex neochordae to P2.  Echo 2014 completely normal (with evidence of prior repair)    Mitral valve prolapse     Parapneumonic effusion     pneumonia and post-pericardiotomy syndrome, 2014, requiring VATS/decortication    Postoperative atrial fibrillation     after open heart surgery, no recurrence       Past Surgical History:   Procedure Laterality Date    ABLATION OF DYSRHYTHMIC FOCUS      BRONCHOSCOPY      CARDIAC  CATHETERIZATION      CARDIAC VALVE REPLACEMENT      COLONOSCOPY      KNEE SURGERY      MAZE PROCEDURE      Pulmonary venous ablation with cryoablation 14, Dr Sales    MITRAL VALVE REPAIR/REPLACEMENT      Mitral Valve Repair 14, Dr Ram    THORACOSCOPY W/ TALC PLEURODESIS         Social History     Socioeconomic History    Marital status:    Tobacco Use    Smoking status: Former     Current packs/day: 0.00     Average packs/day: 1 pack/day for 15.0 years (15.0 ttl pk-yrs)     Types: Cigarettes     Start date: 1966     Quit date: 1981     Years since quittin.9     Passive exposure: Past    Smokeless tobacco: Never    Tobacco comments:     Caffeine use: 1 CUP DAILY.   Vaping Use    Vaping status: Never Used   Substance and Sexual Activity    Alcohol use: Yes     Alcohol/week: 2.0 standard drinks of alcohol     Types: 2 Cans of beer per week     Comment: 8/week/ social use    Drug use: No    Sexual activity: Yes     Partners: Female     Birth control/protection: Surgical       Family History   Problem Relation Age of Onset    Hypertension Mother     Diabetes Mother     Stroke Mother     Hypertension Father     Other Father         cardiac disorder  some type of heart disease in his 80's (? if valvular, conduction, CAD, or CHF)    Heart attack Father     Heart disease Father     Stroke Other        Review of Systems   Cardiovascular:  Negative for chest pain.   Respiratory:  Negative for shortness of breath.    Musculoskeletal:  Positive for arthritis.   All other systems reviewed and are negative.      Allergies   Allergen Reactions    Codeine     Other      ANESTHESIA IV SET MISC         Current Outpatient Medications:     amoxicillin (AMOXIL) 500 MG capsule, Take 4 capsules by mouth See Admin Instructions. 4 capsules 1 hour prior to procedure, Disp: 4 capsule, Rfl: 3    aspirin 81 MG EC tablet, Take  by mouth every other day., Disp: , Rfl:     atorvastatin (LIPITOR) 40 MG tablet, TAKE ONE  "TABLET BY MOUTH ONCE DAILY, Disp: 90 tablet, Rfl: 1    desonide (DESOWEN) 0.05 % lotion, Apply topically., Disp: , Rfl:     levothyroxine (SYNTHROID, LEVOTHROID) 100 MCG tablet, Daily., Disp: , Rfl:     lisinopril (PRINIVIL,ZESTRIL) 20 MG tablet, TAKE ONE TABLET BY MOUTH ONCE DAILY, Disp: 90 tablet, Rfl: 1    sildenafil (REVATIO) 20 MG tablet, Take 1-3 tablets by mouth., Disp: , Rfl:      Objective:     Vitals:    12/11/24 1307   BP: 126/80   BP Location: Left arm   Patient Position: Sitting   Cuff Size: Large Adult   Pulse: 66   SpO2: 98%   Weight: 87.5 kg (193 lb)   Height: 175.3 cm (69\")     Body mass index is 28.5 kg/m².    Physical Exam  Vitals reviewed.   Constitutional:       Appearance: He is well-developed.   HENT:      Head: Normocephalic.      Nose: Nose normal.      Mouth/Throat:      Pharynx: Oropharynx is clear.   Eyes:      Conjunctiva/sclera: Conjunctivae normal.   Neck:      Vascular: No JVD.   Cardiovascular:      Rate and Rhythm: Normal rate and regular rhythm.      Pulses: Normal pulses and intact distal pulses.      Heart sounds: Normal heart sounds. No murmur heard.  Pulmonary:      Effort: Pulmonary effort is normal.      Breath sounds: Normal breath sounds.   Abdominal:      Palpations: Abdomen is soft.      Tenderness: There is no abdominal tenderness.   Musculoskeletal:         General: Normal range of motion.      Cervical back: Normal range of motion.   Skin:     General: Skin is warm and dry.   Neurological:      Mental Status: He is alert. Mental status is at baseline.   Psychiatric:         Mood and Affect: Mood normal.           ECG 12 Lead    Date/Time: 12/11/2024 1:38 PM  Performed by: Alek Pastrana MD    Authorized by: Alek Pastrana MD  Comparison: compared with previous ECG   Similar to previous ECG  Rhythm: sinus rhythm  Conduction: conduction normal  ST Segments: ST segments normal  T Waves: T waves normal  QRS axis: normal  Other: no other findings    Clinical impression: " normal ECG          Assessment:       Diagnosis Plan   1. History of mitral valve repair        2. Primary hypertension            Plan:       1.  He has done very well.  He had a surveillance echo in 2023 that looked great. We'll repeat an echo in 2026.  He takes amoxicillin for SBE prophylaxis prior to dental procedures.     2.  His BP is very well controlled.        Sincerely,       Alek Pastrana MD